# Patient Record
Sex: MALE | Race: WHITE | NOT HISPANIC OR LATINO | Employment: FULL TIME | URBAN - METROPOLITAN AREA
[De-identification: names, ages, dates, MRNs, and addresses within clinical notes are randomized per-mention and may not be internally consistent; named-entity substitution may affect disease eponyms.]

---

## 2017-05-21 ENCOUNTER — OFFICE VISIT (OUTPATIENT)
Dept: URGENT CARE | Facility: PHYSICIAN GROUP | Age: 42
End: 2017-05-21

## 2017-05-21 VITALS
TEMPERATURE: 99.1 F | HEART RATE: 56 BPM | OXYGEN SATURATION: 95 % | BODY MASS INDEX: 21.76 KG/M2 | SYSTOLIC BLOOD PRESSURE: 100 MMHG | HEIGHT: 70 IN | WEIGHT: 152 LBS | RESPIRATION RATE: 22 BRPM | DIASTOLIC BLOOD PRESSURE: 64 MMHG

## 2017-05-21 DIAGNOSIS — Z87.09 HISTORY OF ASTHMA: ICD-10-CM

## 2017-05-21 DIAGNOSIS — R06.2 WHEEZING: ICD-10-CM

## 2017-05-21 PROCEDURE — 99213 OFFICE O/P EST LOW 20 MIN: CPT | Mod: 25 | Performed by: PHYSICIAN ASSISTANT

## 2017-05-21 PROCEDURE — 94640 AIRWAY INHALATION TREATMENT: CPT | Performed by: PHYSICIAN ASSISTANT

## 2017-05-21 RX ORDER — ALBUTEROL SULFATE 90 UG/1
1-2 AEROSOL, METERED RESPIRATORY (INHALATION) EVERY 6 HOURS PRN
Qty: 8.5 G | Refills: 0 | Status: SHIPPED | OUTPATIENT
Start: 2017-05-21 | End: 2020-12-07

## 2017-05-21 RX ORDER — ALBUTEROL SULFATE 2.5 MG/3ML
2.5 SOLUTION RESPIRATORY (INHALATION) ONCE
Status: COMPLETED | OUTPATIENT
Start: 2017-05-21 | End: 2017-05-21

## 2017-05-21 RX ORDER — ALBUTEROL SULFATE 90 UG/1
1-2 AEROSOL, METERED RESPIRATORY (INHALATION) EVERY 6 HOURS PRN
Qty: 8.5 G | Refills: 0 | Status: SHIPPED | OUTPATIENT
Start: 2017-05-21 | End: 2017-05-21 | Stop reason: SDUPTHER

## 2017-05-21 RX ADMIN — ALBUTEROL SULFATE 2.5 MG: 2.5 SOLUTION RESPIRATORY (INHALATION) at 16:37

## 2017-05-21 NOTE — MR AVS SNAPSHOT
"Keaton Johnson   2017 3:45 PM   Office Visit   MRN: [id number]    Department:  [facility]   Dept Phone:  [phone]    Description:  Male : 1975   Provider:  Gemma Bolden PA-C           Reason for Visit     Shortness of Breath shortness of breath and can't catch [id number], slight wheeze      Allergies as of 2017     No Known Allergies      You were diagnosed with     Wheezing   [786.07.ICD-9-CM]       History of asthma   [504915]         Vital Signs     Blood Pressure Pulse Temperature Respirations Height Weight    100/64 mmHg 56 37.3 °C (99.1 °F) 22 1.778 m (5' 10\") 68.947 kg (152 lb)    Body Mass Index Oxygen Saturation Smoking Status             21.81 kg/m2 95% Never Smoker          Basic Information     Date Of Birth Sex Race Ethnicity Preferred Language    1975 Male White Non- English      Health Maintenance        Date Due Completion Dates    IMM DTaP/Tdap/Td Vaccine (1 - Tdap) 1995 ---            Current Immunizations     No immunizations on file.      Below and/or attached are the medications your provider expects you to take. Review all of your home medications and newly ordered medications with your provider and/or pharmacist. Follow medication instructions as directed by your provider and/or pharmacist. Please keep your medication list with you and share with your provider. Update the information when medications are discontinued, doses are changed, or new medications (including over-the-counter products) are added; and carry medication information at all times in the event of emergency situations     Allergies:  No Known Allergies          Medications  Valid as of: May 21, 2017 -  4:41 PM    Generic Name Brand Name Tablet Size Instructions for use    Albuterol Sulfate (Aero Soln) albuterol 108 (90 BASE) MCG/ACT Inhale 1-2 Puffs by mouth every 6 hours as needed.        Azithromycin (Tab) ZITHROMAX 250 MG 2 tabs by mouth day 1, 1 tab by mouth days " 2-5        Azithromycin (Tab) ZITHROMAX 250 MG 2 tabs by mouth day 1, 1 tab by mouth days 2-5        Chlorphen-Phenyleph-ASA   Take  by mouth.          Guaifenesin-Codeine (Solution) ROBITUSSIN -10 mg/5mL Take 5 mL by mouth every four hours as needed for Cough.        .                 Medicines prescribed today were sent to:     [id number]  [place], [place] - [address] AT [facility] [address] [facility] [employer] [address]    [address] [place] [address]    Phone: [phone] Fax: [phone]    Open 24 Hours?: No    [id number] - [place], [place] - [address]    [address] [place] [address]    Phone: [phone] Fax: [phone]    Open 24 Hours?: No      Medication refill instructions:       If your prescription bottle indicates you have medication refills left, it is not necessary to call your provider’s office. Please contact your pharmacy and they will refill your medication.    If your prescription bottle indicates you do not have any refills left, you may request refills at any time through one of the following ways: The online HauteDay system (except Urgent Care), by calling your provider’s office, or by asking your pharmacy to contact your provider’s office with a refill request. Medication refills are processed only during regular business hours and may not be available until the next business day. Your provider may request additional information or to have a follow-up visit with you prior to refilling your medication.   *Please Note: Medication refills are assigned a new Rx number when refilled electronically. Your pharmacy may indicate that no refills were authorized even though a new prescription for the same medication is available at the pharmacy. Please request the medicine by name with the pharmacy before contacting your provider for a refill.           HauteDay Access Code: [email or web address]  Expires: 6/20/2017  3:45 PM    HauteDay  A secure, online tool to manage your health  information     [employer]’s Localbase® is a secure, online tool that connects you to your personalized health information from the privacy of your home -- day or night - making it very easy for you to manage your healthcare. Once the activation process is completed, you can even access your medical information using the Localbase minh, which is available for free in the Apple Minh store or Google Play store.     Localbase provides the following levels of access (as shown below):   My Chart Features   [facility] Doctor [facility]  Specialists [facility]  Urgent  Care Non-[facility]  Primary Care  Doctor   Email your healthcare team securely and privately 24/7 X X X    Manage appointments: schedule your next appointment; view details of past/upcoming appointments X      Request prescription refills. X      View recent personal medical records, including lab and immunizations X X X X   View health record, including health history, allergies, medications X X X X   Read reports about your outpatient visits, procedures, consult and ER notes X X X X   See your discharge summary, which is a recap of your hospital and/or ER visit that includes your diagnosis, lab results, and care plan. X X       How to register for Localbase:  1. Go to  [email or web address].  2. Click on the Sign Up Now box, which takes you to the New Member Sign Up page. You will need to provide the following information:  a. Enter your Localbase Access Code exactly as it appears at the top of this page. (You will not need to use this code after you’ve completed the sign-up process. If you do not sign up before the expiration date, you must request a new code.)   b. Enter your date of birth.   c. Enter your home email address.   d. Click Submit, and follow the next screen’s instructions.  3. Create a Localbase ID. This will be your Localbase login ID and cannot be changed, so think of one that is secure and easy to remember.  4. Create a Localbase password. You can  change your password at any time.  5. Enter your Password Reset Question and Answer. This can be used at a later time if you forget your password.   6. Enter your e-mail address. This allows you to receive e-mail notifications when new information is available in [employer].  7. Click Sign Up. You can now view your health information.    For assistance activating your [employer] account, call [phone]

## 2017-05-21 NOTE — PROGRESS NOTES
"Chief Complaint   Patient presents with   • Shortness of Breath     shortness of breath and can't catch [id number], slight wheeze       HISTORY OF PRESENT ILLNESS: Patient is a 42 y.o. male who presents today for 36 hours of chest tightness, slight wheezing sensation.  States it \"feels similar\" to when he had asthma as child he states but really has not dealt much with wheezing or breathing issues as an adult.    He has not been coughing too much.  Feels there may have been allergic inciting factor.  No fevers or chills.   No nasal congestion or ear pain.   No attempted interventions.  He states he remembers having a rescue inhaler but he does not have that anymore.  No chest pain, no painful breathing.  No leg swelling.  No hx of smoking.     There are no active problems to display for this patient.      Allergies:Review of patient's allergies indicates no known allergies.    Current Outpatient Prescriptions Ordered in Psychiatric   Medication Sig Dispense Refill   • Chlorphen-Phenyleph-ASA (AUSTIN-SELTZER PLUS COLD PO) Take  by mouth.       • azithromycin (ZITHROMAX) 250 MG TABS 2 tabs by mouth day 1, 1 tab by mouth days 2-5 6 Each 0   • guaifenesin-codeine (ROBITUSSIN AC) SOLN Take 5 mL by mouth every four hours as needed for Cough. 120 mL 0   • azithromycin (ZITHROMAX) 250 MG TABS 2 tabs by mouth day 1, 1 tab by mouth days 2-5 6 Tab 0     No current Epic-ordered facility-administered medications on file.       Past Medical History   Diagnosis Date   • Asthma      as a child       Social History   Substance Use Topics   • Smoking status: Never Smoker    • Smokeless tobacco: Not on file   • Alcohol Use: No       No family status information on file.   No family history on file. No pertinent FH    ROS:  Review of Systems   Constitutional: Negative for fever, chills, weight loss and malaise/fatigue.   HENT: SEE HPI  Eyes: Negative for blurred vision.   Respiratory: SEE HPI  Cardiovascular: Negative for chest pain, " "palpitations, orthopnea and leg swelling.   Gastrointestinal: Negative for heartburn, nausea, vomiting and abdominal pain.   All other systems reviewed and are negative.       Exam:  Blood pressure 100/64, pulse 56, temperature 37.3 °C (99.1 °F), resp. rate 22, height 1.778 m (5' 10\"), weight 68.947 kg (152 lb), SpO2 95 %.  General:  Well nourished, well developed male in NAD  Eyes: PERRLA, EOM within normal limits, no conjunctival injection, no scleral icterus, visual fields and acuity grossly intact.  Ears: Normal shape and symmetry, no tenderness, no discharge. External canals are without any significant edema or erythema. Tympanic membranes are without any inflammation, no effusion. Gross auditory acuity is intact  Nose: Symmetrical, sinuses without tenderness, clear rhinorrhea.   Mouth: reasonable hygiene, no erythema exudates or tonsillar enlargement.  Neck: no masses, range of motion within normal limits, no tracheal deviation. No lymphadenopathy  Pulmonary: Normal respiratory effort, few faint scattered expiratory wheezes without crackles or rhonchi.   Cardiovascular: regular rate and rhythm without murmurs, rubs, or gallops.  Skin: No visible rashes or lesion. Warm, pink, dry.   Extremities: no clubbing, cyanosis, or edema.  Neuro: A&O x 3. Speech normal/clear.  Normal gait.         Assessment/Plan:  1. Wheezing  albuterol (PROVENTIL) 2.5mg/3ml nebulizer solution 2.5 mg    albuterol 108 (90 BASE) MCG/ACT Aero Soln inhalation aerosol    DISCONTINUED: albuterol 108 (90 BASE) MCG/ACT Aero Soln inhalation aerosol   2. History of asthma  albuterol 108 (90 BASE) MCG/ACT Aero Soln inhalation aerosol    DISCONTINUED: albuterol 108 (90 BASE) MCG/ACT Aero Soln inhalation aerosol         -patient reports great improvement in sensation of wheezing and tightness in chest after breathing treatment today.   -will rx albuterol inhaler.   -consider daily allergy pill for now as well.   -humidifier/steamy showers to assist " in soothing lungs  -no clinical need for steroids today however discussed in detail RTC precautions/ER precautions.     Supportive care, differential diagnoses, and indications for immediate follow-up discussed with patient.   Pathogenesis of diagnosis discussed including typical length and natural progression.   Instructed to return to clinic or nearest emergency department for any change in condition, further concerns, or worsening of symptoms.  Patient states understanding of the plan of care and discharge instructions.      Gemma Bolden PA-C

## 2019-11-17 ENCOUNTER — OFFICE VISIT (OUTPATIENT)
Dept: URGENT CARE | Facility: PHYSICIAN GROUP | Age: 44
End: 2019-11-17

## 2019-11-17 VITALS
BODY MASS INDEX: 20.76 KG/M2 | TEMPERATURE: 99.1 F | WEIGHT: 145 LBS | SYSTOLIC BLOOD PRESSURE: 108 MMHG | DIASTOLIC BLOOD PRESSURE: 70 MMHG | OXYGEN SATURATION: 98 % | HEART RATE: 59 BPM | RESPIRATION RATE: 16 BRPM | HEIGHT: 70 IN

## 2019-11-17 DIAGNOSIS — S39.94XA SCROTAL INJURY, INITIAL ENCOUNTER: ICD-10-CM

## 2019-11-17 LAB
APPEARANCE UR: CLEAR
BILIRUB UR STRIP-MCNC: NORMAL MG/DL
COLOR UR AUTO: YELLOW
GLUCOSE UR STRIP.AUTO-MCNC: NORMAL MG/DL
KETONES UR STRIP.AUTO-MCNC: NORMAL MG/DL
LEUKOCYTE ESTERASE UR QL STRIP.AUTO: NORMAL
NITRITE UR QL STRIP.AUTO: NORMAL
PH UR STRIP.AUTO: 6.5 [PH] (ref 5–8)
PROT UR QL STRIP: NORMAL MG/DL
RBC UR QL AUTO: NORMAL
SP GR UR STRIP.AUTO: 1.03
UROBILINOGEN UR STRIP-MCNC: NORMAL MG/DL

## 2019-11-17 PROCEDURE — 81002 URINALYSIS NONAUTO W/O SCOPE: CPT | Performed by: NURSE PRACTITIONER

## 2019-11-17 PROCEDURE — 99214 OFFICE O/P EST MOD 30 MIN: CPT | Performed by: NURSE PRACTITIONER

## 2019-11-17 NOTE — PROGRESS NOTES
Subjective:      Keaton Johnson is a 44 y.o. male who presents with Groin Pain ( right testicle swelling, kicked a lesly yesterday and since then hes bot feeling good, x1 day)            HPI  C/o right testicle pain since last night. Discomfort with sit, stand, walking. Denies dysuria or hematuria. Vesecotomy 3 weeks ago. No heat or NSAID, has changed from boxers to briefs. States had kicked a stool with his right leg and felt a pulled muscle in his right upper inner thigh/groin region under right scrotum.     PMH:  has a past medical history of ASTHMA.  MEDS:   Current Outpatient Medications:   •  albuterol 108 (90 BASE) MCG/ACT Aero Soln inhalation aerosol, Inhale 1-2 Puffs by mouth every 6 hours as needed. (Patient not taking: Reported on 11/17/2019), Disp: 8.5 g, Rfl: 0  •  Chlorphen-Phenyleph-ASA (AUSTIN-SELTZER PLUS COLD PO), Take  by mouth.  , Disp: , Rfl:   •  azithromycin (ZITHROMAX) 250 MG TABS, 2 tabs by mouth day 1, 1 tab by mouth days 2-5 (Patient not taking: Reported on 11/17/2019), Disp: 6 Each, Rfl: 0  •  guaifenesin-codeine (ROBITUSSIN AC) SOLN, Take 5 mL by mouth every four hours as needed for Cough. (Patient not taking: Reported on 11/17/2019), Disp: 120 mL, Rfl: 0  •  azithromycin (ZITHROMAX) 250 MG TABS, 2 tabs by mouth day 1, 1 tab by mouth days 2-5 (Patient not taking: Reported on 11/17/2019), Disp: 6 Tab, Rfl: 0  ALLERGIES: No Known Allergies  SURGHX: No past surgical history on file.  SOCHX:  reports that he has never smoked. He does not have any smokeless tobacco history on file. He reports that he does not drink alcohol or use drugs.  FH: Family history was reviewed, no pertinent findings to report    Review of Systems   Constitutional: Negative for chills, fever and malaise/fatigue.   Gastrointestinal: Negative for abdominal pain, constipation, diarrhea, nausea and vomiting.   Genitourinary: Negative for dysuria, flank pain, frequency, hematuria and urgency.   Musculoskeletal: Negative for  "back pain and myalgias.   Skin: Negative for itching and rash.   Neurological: Negative for dizziness, weakness and headaches.   All other systems reviewed and are negative.         Objective:     /70 (BP Location: Right arm, Patient Position: Sitting, BP Cuff Size: Adult)   Pulse (!) 59   Temp 37.3 °C (99.1 °F) (Temporal)   Resp 16   Ht 1.778 m (5' 10\")   Wt 65.8 kg (145 lb)   SpO2 98%   BMI 20.81 kg/m²      Physical Exam  Vitals signs reviewed.   Constitutional:       General: He is not in acute distress.     Appearance: Normal appearance. He is well-developed. He is not ill-appearing, toxic-appearing or diaphoretic.   HENT:      Head: Normocephalic.   Eyes:      Conjunctiva/sclera: Conjunctivae normal.      Pupils: Pupils are equal, round, and reactive to light.   Neck:      Musculoskeletal: Normal range of motion and neck supple.   Cardiovascular:      Rate and Rhythm: Normal rate and regular rhythm.   Pulmonary:      Effort: Pulmonary effort is normal.      Breath sounds: Normal breath sounds.   Abdominal:      General: Bowel sounds are normal. There is no distension.      Palpations: Abdomen is soft.      Tenderness: There is no tenderness. There is no guarding or rebound.      Hernia: No hernia is present. There is no hernia in the right inguinal area or left inguinal area.   Genitourinary:     Penis: Circumcised. No tenderness or swelling.       Scrotum/Testes:         Right: Tenderness and swelling present. Right testis is descended. Cremasteric reflex is present.       Epididymis:      Right: Not inflamed or enlarged. Tenderness present.          Comments: No redness, mild swelling or right testicle, hangs lower than left testicle but patient states \"normal\". Mild TTP at underside of right testicle. No lump or mass felt. Possible hydrocele from injury. Able to move testicle without difficulty or increased pain. No TTP, redness, swelling of left testicle. No redness, swelling or d/c from penis, " no dermal lesions seen. Mild antalgic gait.                                   Musculoskeletal: Normal range of motion.      Right upper leg: He exhibits tenderness. He exhibits no bony tenderness, no swelling, no edema and no deformity.        Legs:       Comments: No redness, bruising or deformity of muscle of upper right inner thigh but has mild TTP at this area.   Skin:     General: Skin is warm and dry.   Neurological:      Mental Status: He is alert and oriented to person, place, and time.   Psychiatric:         Behavior: Behavior is cooperative.                 Assessment/Plan:       1. Scrotal injury, initial encounter    - POCT Urinalysis: Bld  -US not available today through   Patient denies US tomorrow, patient opts to go to ER at this time  Patient stable, vitals stable  Patient to go POV to ER

## 2019-11-19 ASSESSMENT — ENCOUNTER SYMPTOMS
DIZZINESS: 0
WEAKNESS: 0
CHILLS: 0
HEADACHES: 0
NAUSEA: 0
VOMITING: 0
ABDOMINAL PAIN: 0
CONSTIPATION: 0
MYALGIAS: 0
BACK PAIN: 0
FEVER: 0
FLANK PAIN: 0
DIARRHEA: 0

## 2020-12-07 ENCOUNTER — OFFICE VISIT (OUTPATIENT)
Dept: URGENT CARE | Facility: PHYSICIAN GROUP | Age: 45
End: 2020-12-07

## 2020-12-07 ENCOUNTER — SUPERVISING PHYSICIAN REVIEW (OUTPATIENT)
Dept: URGENT CARE | Facility: CLINIC | Age: 45
End: 2020-12-07

## 2020-12-07 VITALS
BODY MASS INDEX: 20.76 KG/M2 | OXYGEN SATURATION: 100 % | SYSTOLIC BLOOD PRESSURE: 116 MMHG | HEIGHT: 70 IN | HEART RATE: 64 BPM | RESPIRATION RATE: 16 BRPM | WEIGHT: 145 LBS | DIASTOLIC BLOOD PRESSURE: 72 MMHG | TEMPERATURE: 98 F

## 2020-12-07 DIAGNOSIS — J01.90 ACUTE BACTERIAL SINUSITIS: ICD-10-CM

## 2020-12-07 DIAGNOSIS — B96.89 ACUTE BACTERIAL SINUSITIS: ICD-10-CM

## 2020-12-07 DIAGNOSIS — J45.21 MILD INTERMITTENT ASTHMA WITH EXACERBATION: ICD-10-CM

## 2020-12-07 PROCEDURE — 99214 OFFICE O/P EST MOD 30 MIN: CPT | Performed by: PHYSICIAN ASSISTANT

## 2020-12-07 RX ORDER — ALBUTEROL SULFATE 90 UG/1
2 AEROSOL, METERED RESPIRATORY (INHALATION) EVERY 6 HOURS PRN
Qty: 8 G | Refills: 2 | Status: SHIPPED | OUTPATIENT
Start: 2020-12-07

## 2020-12-07 RX ORDER — PREDNISONE 10 MG/1
40 TABLET ORAL DAILY
Qty: 20 TAB | Refills: 0 | Status: SHIPPED | OUTPATIENT
Start: 2020-12-07 | End: 2020-12-12

## 2020-12-07 RX ORDER — AMOXICILLIN AND CLAVULANATE POTASSIUM 875; 125 MG/1; MG/1
1 TABLET, FILM COATED ORAL 2 TIMES DAILY
Qty: 14 TAB | Refills: 0 | Status: SHIPPED | OUTPATIENT
Start: 2020-12-07 | End: 2020-12-14

## 2020-12-07 ASSESSMENT — ENCOUNTER SYMPTOMS
MYALGIAS: 0
COUGH: 1
WHEEZING: 1
DIARRHEA: 0
SPUTUM PRODUCTION: 1
EYE PAIN: 0
CHILLS: 0
NAUSEA: 0
ABDOMINAL PAIN: 0
HEADACHES: 0
SINUS PAIN: 1
SHORTNESS OF BREATH: 0
FEVER: 0
SORE THROAT: 1
VOMITING: 0
CONSTIPATION: 0

## 2020-12-07 NOTE — PROGRESS NOTES
I have reviewed and agree with history, assessment and plan for office encounter on 12/7/2020 with Advanced Practice Provider:New Hyatt PAC .  Face to face encounter/direct observation: No  Suggested changes to plan or follow-up: none   Saad Adames M.D.

## 2020-12-07 NOTE — PROGRESS NOTES
"Subjective:   Keaton Johnson is a 45 y.o. male who presents for Sinus Problem (Tons of drainage and mucus going into chest ongoing 3 weeks.)      HPI:  This is a pleasant 45-year-old male presenting to urgent care complaining of 3 weeks of symptoms beginning with nasal congestion, sinus pressure, and excessive postnasal drip which transitioned into more of a wet cough with expectoration of sputum over the last week.  He has not noted any fever/chills.  He has no known sick contacts.  He gets similar infections commonly.  He has a history of asthma and notes that he has used his albuterol inhaler once or twice in the last week and noted improvement.    Review of Systems   Constitutional: Negative for chills, fever and malaise/fatigue.   HENT: Positive for congestion, sinus pain and sore throat. Negative for ear pain.    Eyes: Negative for pain.   Respiratory: Positive for cough, sputum production and wheezing. Negative for shortness of breath.    Cardiovascular: Negative for chest pain.   Gastrointestinal: Negative for abdominal pain, constipation, diarrhea, nausea and vomiting.   Genitourinary: Negative for dysuria.   Musculoskeletal: Negative for myalgias.   Skin: Negative for rash.   Neurological: Negative for headaches.       Medications:    • AUSTIN-SELTZER PLUS COLD PO    Allergies: Patient has no known allergies.    Problem List: Keaton Johnson does not have a problem list on file.    Surgical History:  No past surgical history on file.    Past Social Hx: Keaton Johnson  reports that he has never smoked. He has never used smokeless tobacco. He reports that he does not drink alcohol or use drugs.     Past Family Hx:  Keaton Johnson family history is not on file.     Problem list, medications, and allergies reviewed by myself today in Epic.     Objective:     /72   Pulse 64   Temp 36.7 °C (98 °F) (Temporal)   Resp 16   Ht 1.778 m (5' 10\")   Wt 65.8 kg (145 lb)   SpO2 100%   BMI 20.81 kg/m²     Physical " Exam  Vitals signs reviewed.   Constitutional:       Appearance: Normal appearance.   HENT:      Head: Normocephalic and atraumatic.      Right Ear: External ear normal.      Left Ear: External ear normal.      Nose: Congestion and rhinorrhea present.      Mouth/Throat:      Mouth: Mucous membranes are moist.      Pharynx: No oropharyngeal exudate or posterior oropharyngeal erythema.      Comments: POST NASAL DRIP  Eyes:      Conjunctiva/sclera: Conjunctivae normal.   Cardiovascular:      Rate and Rhythm: Normal rate and regular rhythm.   Pulmonary:      Effort: Pulmonary effort is normal.      Comments: Mild end expiratory wheezes, rhonchi that clear with cough  Skin:     General: Skin is warm and dry.      Capillary Refill: Capillary refill takes less than 2 seconds.   Neurological:      Mental Status: He is alert and oriented to person, place, and time.         Assessment/Plan:     Diagnosis and associated orders:     1. Mild intermittent asthma with exacerbation  albuterol 108 (90 Base) MCG/ACT Aero Soln inhalation aerosol    predniSONE (DELTASONE) 10 MG Tab   2. Acute bacterial sinusitis  amoxicillin-clavulanate (AUGMENTIN) 875-125 MG Tab      Comments/MDM:     • We will treat with antibiotics as well as prednisone and albuterol.  Patient likely has a coinfection with a bacterial sinus infection which is causing some lung inflammation.  He is not demonstrated rigors, tachycardia, shortness of breath, or fevers I am less concerned about community-acquired pneumonia and I think he will benefit regardless from the broad coverage of Augmentin, 5 days of prednisone, and I instructed him to use his albuterol inhaler liberally.  • I gave him specific instructions for returning including shortness of breath, development of fevers, or any new concerning symptoms and he demonstrated understanding         Differential diagnosis, natural history, supportive care, and indications for immediate follow-up  discussed.    Advised the patient to follow-up with the primary care physician for recheck, reevaluation, and consideration of further management.    Please note that this dictation was created using voice recognition software. I have made a reasonable attempt to correct obvious errors, but I expect that there are errors of grammar and possibly content that I did not discover before finalizing the note.    This note was electronically signed by New Hyatt PA-C

## 2020-12-10 ENCOUNTER — TELEPHONE (OUTPATIENT)
Dept: URGENT CARE | Facility: PHYSICIAN GROUP | Age: 45
End: 2020-12-10

## 2020-12-10 NOTE — TELEPHONE ENCOUNTER
Returned patient's call regarding eruption of a rash after augmentin, he does not have any oral lesions and does not describe anything concerning for SJS/Tens. Advised DC augmentin, start antihistmines, use otc hydrocortisone judiciously for topical itchy areas.  Continue prednisone and albuterol.  Educated on s/s warranting ER presentation in including those for SJS/TENS.  Pt appreciative of call and all questions answered.  Approximately 9 minutes spent.    New Hyatt P.A.-C.

## 2020-12-10 NOTE — TELEPHONE ENCOUNTER
1. Name: Keaton Johnson      Call Back Number: [phone]          How would the patient prefer to be contacted with a response: Phone call OK to leave a detailed message    2. What are the patient's symptoms (location & severity)? Patient thinks he is having allergic reaction to Medication, He began with itching on his arm pits, and now has bumps on abdomen.    3. Is this a new symptom Yes    4. When did it start? 12/08/2020 night    He is still using the albuterol 108 but has discontinued use of predniSONE and amoxicillin-clavulanate. Please advise. Thank you.

## 2023-04-16 ENCOUNTER — APPOINTMENT (OUTPATIENT)
Dept: URGENT CARE | Facility: PHYSICIAN GROUP | Age: 48
End: 2023-04-16

## 2023-04-16 ENCOUNTER — OFFICE VISIT (OUTPATIENT)
Dept: URGENT CARE | Facility: PHYSICIAN GROUP | Age: 48
End: 2023-04-16

## 2023-04-16 VITALS
HEIGHT: 70 IN | OXYGEN SATURATION: 94 % | DIASTOLIC BLOOD PRESSURE: 64 MMHG | WEIGHT: 150 LBS | TEMPERATURE: 98.7 F | RESPIRATION RATE: 16 BRPM | BODY MASS INDEX: 21.47 KG/M2 | SYSTOLIC BLOOD PRESSURE: 102 MMHG | HEART RATE: 57 BPM

## 2023-04-16 DIAGNOSIS — H57.89 EYE IRRITATION: ICD-10-CM

## 2023-04-16 DIAGNOSIS — H10.33 ACUTE CONJUNCTIVITIS OF BOTH EYES, UNSPECIFIED ACUTE CONJUNCTIVITIS TYPE: ICD-10-CM

## 2023-04-16 PROCEDURE — 99213 OFFICE O/P EST LOW 20 MIN: CPT | Performed by: NURSE PRACTITIONER

## 2023-04-16 RX ORDER — OFLOXACIN 3 MG/ML
2 SOLUTION/ DROPS OPHTHALMIC EVERY 4 HOURS
Qty: 10 ML | Refills: 0 | Status: SHIPPED | OUTPATIENT
Start: 2023-04-16 | End: 2023-04-23

## 2023-04-16 ASSESSMENT — ENCOUNTER SYMPTOMS
BLURRED VISION: 0
CONSTITUTIONAL NEGATIVE: 1
EYE PAIN: 1
EYE REDNESS: 1
DOUBLE VISION: 0
EYE DISCHARGE: 1

## 2023-04-16 ASSESSMENT — VISUAL ACUITY: OU: 1

## 2023-04-16 NOTE — PROGRESS NOTES
"Subjective:     Keaton Johnson is a 47 y.o. male who presents for Eye Problem (Pt was working in yard x1 week, dust blew in eyes)       Eye Problem   Both eyes are affected. This is a new problem. The problem has been unchanged. He Does not wear contacts. Associated symptoms include an eye discharge and eye redness. Pertinent negatives include no blurred vision or double vision.     About 1 week ago, patient was working in his yard when the wind blew.  [place] some dust got into his eyes.  Continues to have irritation bilaterally as well as foreign body sensation.  Had some goopy discharge at his left eye for about a day.  Has some redness of his eyes.  Concerned of infection.    Review of Systems   Constitutional: Negative.    Eyes:  Positive for pain (Irritation), discharge and redness. Negative for blurred vision and double vision.   All other systems reviewed and are negative.    Refer to HPI for additional details.    During this visit, appropriate PPE was worn, hand hygiene was performed, and the patient and any visitors were masked.    PMH:  has a past medical history of ASTHMA.    MEDS:   Current Outpatient Medications:     ofloxacin (OCUFLOX) 0.3 % Solution, Administer 2 Drops into both eyes every 4 hours for 7 days., Disp: 10 mL, Rfl: 0    albuterol 108 (90 Base) MCG/ACT Aero Soln inhalation aerosol, Inhale 2 Puffs every 6 hours as needed for Shortness of Breath., Disp: 8 g, Rfl: 2    ALLERGIES: No Known Allergies  SURGHX: History reviewed. No pertinent surgical history.  SOCHX:  reports that he has never smoked. He has never used smokeless tobacco. He reports that he does not drink alcohol and does not use drugs.    FH: Per HPI as applicable/pertinent.      Objective:     /64 (BP Location: Left arm, Patient Position: Sitting, BP Cuff Size: Adult)   Pulse (!) 57   Temp 37.1 °C (98.7 °F) (Temporal)   Resp 16   Ht 1.778 m (5' 10\")   Wt 68 kg (150 lb)   SpO2 94%   BMI 21.52 kg/m²     Physical " Exam  Nursing note reviewed.   Constitutional:       General: He is not in acute distress.     Appearance: He is well-developed. He is not ill-appearing or toxic-appearing.   Eyes:      General: Lids are normal. Lids are everted, no foreign bodies appreciated. Vision grossly intact.         Right eye: No foreign body or discharge.         Left eye: No foreign body or discharge.      Extraocular Movements: Extraocular movements intact.      Conjunctiva/sclera:      Right eye: Right conjunctiva is injected (Mild). No chemosis.     Left eye: Left conjunctiva is injected (Mild). No chemosis.     Pupils: Pupils are equal, round, and reactive to light.      Right eye: No corneal abrasion or fluorescein uptake. Hemalatha exam negative.      Left eye: No corneal abrasion or fluorescein uptake. Hemalatha exam negative.  Cardiovascular:      Rate and Rhythm: Normal rate.   Pulmonary:      Effort: Pulmonary effort is normal. No respiratory distress.   Musculoskeletal:         General: No deformity. Normal range of motion.   Skin:     General: Skin is warm and dry.      Coloration: Skin is not pale.   Neurological:      Mental Status: He is alert and oriented to person, place, and time.      Motor: No weakness.   Psychiatric:         Behavior: Behavior normal. Behavior is cooperative.       Assessment/Plan:     1. Eye irritation    2. Acute conjunctivitis of both eyes, unspecified acute conjunctivitis type  - ofloxacin (OCUFLOX) 0.3 % Solution; Administer 2 Drops into both eyes every 4 hours for 7 days.  Dispense: 10 mL; Refill: 0    Rx as above sent electronically. Advised OTC commercial eye wash kits PRN and artificial lubricating gel tears PRN. Monitor. Return precautions advised.    Differential diagnosis, natural history, supportive care, over-the-counter symptom management per 's instructions, close monitoring, and indications for immediate follow-up discussed.     All questions answered. Patient agrees with the  plan of care.

## 2024-11-02 ENCOUNTER — OFFICE VISIT (OUTPATIENT)
Dept: URGENT CARE | Facility: CLINIC | Age: 49
End: 2024-11-02
Payer: COMMERCIAL

## 2024-11-02 ENCOUNTER — APPOINTMENT (OUTPATIENT)
Dept: RADIOLOGY | Facility: IMAGING CENTER | Age: 49
End: 2024-11-02
Payer: COMMERCIAL

## 2024-11-02 VITALS
HEART RATE: 50 BPM | WEIGHT: 146 LBS | BODY MASS INDEX: 20.9 KG/M2 | TEMPERATURE: 98.9 F | DIASTOLIC BLOOD PRESSURE: 58 MMHG | SYSTOLIC BLOOD PRESSURE: 102 MMHG | RESPIRATION RATE: 16 BRPM | HEIGHT: 70 IN | OXYGEN SATURATION: 97 %

## 2024-11-02 DIAGNOSIS — R05.1 ACUTE COUGH: ICD-10-CM

## 2024-11-02 DIAGNOSIS — J18.9 COMMUNITY ACQUIRED PNEUMONIA OF RIGHT UPPER LOBE OF LUNG: ICD-10-CM

## 2024-11-02 PROCEDURE — 71046 X-RAY EXAM CHEST 2 VIEWS: CPT | Mod: TC,FY | Performed by: RADIOLOGY

## 2024-11-02 PROCEDURE — 99214 OFFICE O/P EST MOD 30 MIN: CPT

## 2024-11-02 PROCEDURE — 3074F SYST BP LT 130 MM HG: CPT

## 2024-11-02 PROCEDURE — 3078F DIAST BP <80 MM HG: CPT

## 2024-11-02 RX ORDER — DOXYCYCLINE 100 MG/1
100 CAPSULE ORAL 2 TIMES DAILY
Qty: 10 CAPSULE | Refills: 0 | Status: SHIPPED | OUTPATIENT
Start: 2024-11-02 | End: 2024-11-02

## 2024-11-02 RX ORDER — ROFLUMILAST 3 MG/G
AEROSOL, FOAM TOPICAL
COMMUNITY

## 2024-11-02 RX ORDER — DUPILUMAB 200 MG/1.14ML
INJECTION, SOLUTION SUBCUTANEOUS
COMMUNITY
Start: 2024-07-19

## 2024-11-02 RX ORDER — DOXYCYCLINE 100 MG/1
100 CAPSULE ORAL 2 TIMES DAILY
Qty: 14 CAPSULE | Refills: 0 | Status: SHIPPED | OUTPATIENT
Start: 2024-11-02 | End: 2024-11-09

## 2024-11-02 NOTE — PROGRESS NOTES
"Verbal consent was acquired by the patient to use [employer] ambient listening note generation during this visit     Date: 11/02/24        Chief Complaint   Patient presents with    Congestion     Chest brooke, cough thin yellow mucus, feverish, x9 days now      History of Present Illness  The patient is a 49-year-old male who presents for evaluation of a cough.    He began experiencing symptoms last Thursday, which have progressively worsened. He reports a persistent cough, initially producing thin, bright yellow mucus, but now yielding little to no expectoration. The coughing fits are severe and cause significant discomfort. He has been using over-the-counter cough suppressants, which have caused some drowsiness. He also reports occasional headaches, which he attributes to his severe coughing.    He has been waking up at night due to excessive sweating, with the most recent episode leaving him drenched. He reports no significant sinus pressure.    He has a history of asthma and bronchiectasis, but his breathing has been relatively normal since July 2024, thanks to Dupixent prescribed by his dermatologist. He has not needed to use his inhaler recently.    He has had adverse reactions to prednisone and methylprednisolone in the past, including the development of a rash.     ROS:    No severe shortness of breath   No Cardiac like chest pain, as discussed   As otherwise stated in HPI    Medical/SX/ Social History:  Reviewed per chart    Pertinent Medications:    Current Outpatient Medications on File Prior to Visit   Medication Sig Dispense Refill    dupilumab (DUPIXENT) 200 MG/1.14ML injection       Roflumilast, Antiseborrheic, (ZORYVE) 0.3 % Foam Apply  topically. \"Topical for scalp\"      albuterol 108 (90 Base) MCG/ACT Aero Soln inhalation aerosol Inhale 2 Puffs every 6 hours as needed for Shortness of Breath. (Patient not taking: Reported on 11/2/2024) 8 g 2     No current facility-administered medications on file " prior to visit.        Allergies:    Prednisone     Problem list, medications, and allergies reviewed by myself today in Epic     Physical Exam:    Vitals:    11/02/24 1150   BP: 102/58   Pulse: (!) 50   Resp: 16   Temp: 37.2 °C (98.9 °F)   SpO2: 97%             Physical Exam  Vitals reviewed.   Constitutional:       General: He is not in acute distress.     Appearance: Normal appearance. He is normal weight. He is not ill-appearing or toxic-appearing.   HENT:      Head: Normocephalic.      Right Ear: External ear normal.      Left Ear: External ear normal.      Nose: Nose normal.      Mouth/Throat:      Mouth: Mucous membranes are moist.   Eyes:      General:         Right eye: No discharge.         Left eye: No discharge.      Extraocular Movements: Extraocular movements intact.      Pupils: Pupils are equal, round, and reactive to light.   Pulmonary:      Effort: Pulmonary effort is normal. No respiratory distress.      Breath sounds: Examination of the right-upper field reveals rales. Examination of the left-upper field reveals rhonchi. Rhonchi and rales present. No wheezing.   Musculoskeletal:         General: Normal range of motion.   Neurological:      General: No focal deficit present.      Mental Status: He is alert.        Diagnostics:    X-ray images viewed and interpreted by APRN, confirmed by radiology:        RADIOLOGY RESULTS   DX-CHEST-2 VIEWS    Result Date: 11/2/2024 11/2/2024 12:15 PM HISTORY/REASON FOR EXAM:  Cough. TECHNIQUE/EXAM DESCRIPTION AND NUMBER OF VIEWS: Two views of the chest. COMPARISON:  None. FINDINGS: The heart is normal in size. Opacifications with poorly defined borders are identified in the right upper pulmonary lobe. No other infiltrates or consolidations are identified. No pleural effusions are appreciated.     1.  Right upper pulmonary lobe opacifications are identified which could indicate pneumonia. Active pulmonary tuberculosis is a possibility.             Medical  Decision making and plan :  I personally reviewed prior external notes and test results pertinent to today's visit. Pt is clinically stable at today's acute urgent care visit.  Patient appears nontoxic with no acute distress noted. Appropriate for outpatient care at this time.    Pleasant 49 y.o. male presented clinic with:     Assessment & Plan  1. Right upper lobe pneumonia.  The x-ray demonstrates right upper lobe pneumonia. Due to his past medical history, he will be started on dual antibiotic therapy with amoxicillin-clavulanate and doxycycline. Supportive care was discussed at length, including the use of over-the-counter agents such as guaifenesin, coughing, deep breathing, increasing fluid intake, increasing oral hydration, and the avoidance of cough suppression. He is to follow up with his primary care provider in the next 2 weeks. A repeat chest x-ray is recommended to ensure that his pneumonia has resolved after antibiotic therapy.    Follow-up  Return in 2 weeks for follow up.      Shared decision-making was utilized with patient for treatment plan. Medication discussed included indication for use and the potential benefits and side effects. Education was provided regarding the aforementioned assessments.  Differential Diagnosis, natural history, and supportive care discussed. All of the patient's questions were answered to their satisfaction at the time of discharge. Patient was encouraged to monitor symptoms closely. Those signs and symptoms which would warrant concern and mandate seeking a higher level of service through the emergency department discussed at length.  Patient stated agreement and understanding of this plan of care.    Disposition:  Home in stable condition     Voice Recognition Disclaimer:  Portions of this document were created using voice recognition software and [employer] technology provided by [employer]. The software does have a chance of producing errors of grammar and possibly content.  I have made every reasonable attempt to correct obvious errors, but there may be errors of grammar and possibly content that I did not discover before finalizing the  documentation.    GISELA Wood.

## 2024-11-02 NOTE — PATIENT INSTRUCTIONS
Community-Acquired Pneumonia, Adult  Pneumonia is a lung infection that causes inflammation and the buildup of mucus and fluids in the lungs. This may cause coughing and difficulty breathing. Community-acquired pneumonia is pneumonia that develops in people who are not, and have not recently been, in a hospital or other health care facility.  Usually, pneumonia develops as a result of an illness that is caused by a virus, such as the common cold and the flu (influenza). It can also be caused by bacteria or fungi. While the common cold and influenza can pass from person to person (are contagious), pneumonia itself is not considered contagious.  What are the causes?  This condition may be caused by:  Viruses.  Bacteria.  Fungi.  What increases the risk?  The following factors may make you more likely to develop this condition:  Being over age 65 or having certain medical conditions, such as:  A long-term (chronic) disease, such as: chronic obstructive pulmonary disease (COPD), asthma, heart failure, diabetes, or kidney disease.  A condition that increases the risk of breathing in (aspirating) mucus and other fluids from your mouth and nose.  A weakened body defense system (immune system).  Having had your spleen removed (splenectomy). The spleen is the organ that helps fight germs and infections.  Not cleaning your teeth and gums well (poor dental hygiene).  Using tobacco products.  Traveling to places where germs that cause pneumonia are present or being near certain animals or animal habitats that could have germs that cause pneumonia.  What are the signs or symptoms?  Symptoms of this condition include:  A dry cough or a wet (productive) cough.  A fever, sweating, or chills.  Chest pain, especially when breathing deeply or coughing.  Fast breathing, difficulty breathing, or shortness of breath.  Tiredness (fatigue) and muscle aches.  How is this diagnosed?  This condition may be diagnosed based on your medical  history or a physical exam. You may also have tests, including:  Imaging, such as a chest X-ray or lung ultrasound.  Tests of:  The level of oxygen and other gases in your blood.  Mucus from your lungs (sputum).  Fluid around your lungs (pleural fluid).  Your urine.  How is this treated?  Treatment for this condition depends on many factors, such as the cause of your pneumonia, your medicines, and other medical conditions that you have.  For most adults, pneumonia may be treated at home. In some cases, treatment must happen in a hospital and may include:  Medicines that are given by mouth (orally) or through an IV, including:  Antibiotic medicines, if bacteria caused the pneumonia.  Medicines that kill viruses (antiviral medicines), if a virus caused the pneumonia.  Oxygen therapy.  Severe pneumonia, although rare, may require the following treatments:  Mechanical ventilation.This procedure uses a machine to help you breathe if you cannot breathe well on your own or maintain a safe level of blood oxygen.  Thoracentesis. This procedure removes any buildup of pleural fluid to help with breathing.  Follow these instructions at home:    Medicines  Take over-the-counter and prescription medicines only as told by your health care provider.  Take cough medicine only if you have trouble sleeping. Cough medicine can prevent your body from removing mucus from your lungs.  If you were prescribed antibiotics, take them as told by your health care provider. Do not stop taking the antibiotic even if you start to feel better.  Lifestyle         Do not drink alcohol.  Do not use any products that contain nicotine or tobacco. These products include cigarettes, chewing tobacco, and vaping devices, such as e-cigarettes. If you need help quitting, ask your health care provider.  Eat a healthy diet. This includes plenty of vegetables, fruits, whole grains, low-fat dairy products, and lean protein.  General instructions  Rest a lot and  get at least 8 hours of sleep each night.  Sleep in a partly upright position at night. Place a few pillows under your head or sleep in a reclining chair.  Return to your normal activities as told by your health care provider. Ask your health care provider what activities are safe for you.  Drink enough fluid to keep your urine pale yellow. This helps to thin the mucus in your lungs.  If your throat is sore, gargle with a mixture of salt and water 3-4 times a day or as needed. To make salt water, completely dissolve ½-1 tsp (3-6 g) of salt in 1 cup (237 mL) of warm water.  Keep all follow-up visits.  How is this prevented?  You can lower your risk of developing community-acquired pneumonia by:  Getting the pneumonia vaccine. There are different types and schedules of pneumonia vaccines. Ask your health care provider which option is best for you. Consider getting the pneumonia vaccine if:  You are older than 65 years of age.  You are 19-65 years of age and are receiving cancer treatment, have chronic lung disease, or have other medical conditions that affect your immune system. Ask your health care provider if this applies to you.  Getting your influenza vaccine every year. Ask your health care provider which type of vaccine is best for you.  Getting regular dental checkups.  Washing your hands often with soap and water for at least 20 seconds. If soap and water are not available, use hand .  Contact a health care provider if:  You have a fever.  You have trouble sleeping because you cannot control your cough with cough medicine.  Get help right away if:  Your shortness of breath becomes worse.  Your chest pain increases.  Your sickness becomes worse, especially if you are an older adult or have a weak immune system.  You cough up blood.  These symptoms may be an emergency. Get help right away. Call 911.  Do not wait to see if the symptoms will go away.  Do not drive yourself to the  hospital.  Summary  Pneumonia is an infection of the lungs.  Community-acquired pneumonia develops in people who have not been in the hospital. It can be caused by bacteria, viruses, or fungi.  This condition may be treated with antibiotics or antiviral medicines.  Severe pneumonia may require a hospital stay and treatment to help with breathing.  This information is not intended to replace advice given to you by your health care provider. Make sure you discuss any questions you have with your health care provider.  Document Revised: 02/15/2023 Document Reviewed: 02/15/2023  [employer] Patient Education © 2023 [employer]